# Patient Record
Sex: FEMALE | Race: WHITE | NOT HISPANIC OR LATINO | Employment: OTHER | URBAN - METROPOLITAN AREA
[De-identification: names, ages, dates, MRNs, and addresses within clinical notes are randomized per-mention and may not be internally consistent; named-entity substitution may affect disease eponyms.]

---

## 2017-01-27 ENCOUNTER — APPOINTMENT (EMERGENCY)
Dept: RADIOLOGY | Facility: HOSPITAL | Age: 82
End: 2017-01-27
Payer: COMMERCIAL

## 2017-01-27 ENCOUNTER — HOSPITAL ENCOUNTER (EMERGENCY)
Facility: HOSPITAL | Age: 82
Discharge: HOME | End: 2017-01-27
Admitting: EMERGENCY MEDICINE
Payer: COMMERCIAL

## 2017-01-27 VITALS
RESPIRATION RATE: 20 BRPM | BODY MASS INDEX: 21.63 KG/M2 | TEMPERATURE: 97.8 F | OXYGEN SATURATION: 96 % | HEART RATE: 70 BPM | WEIGHT: 130 LBS | DIASTOLIC BLOOD PRESSURE: 53 MMHG | SYSTOLIC BLOOD PRESSURE: 109 MMHG

## 2017-01-27 DIAGNOSIS — S70.01XA CONTUSION OF RIGHT HIP, INITIAL ENCOUNTER: Primary | ICD-10-CM

## 2017-01-27 PROCEDURE — 73502 X-RAY EXAM HIP UNI 2-3 VIEWS: CPT

## 2017-01-27 PROCEDURE — 99284 EMERGENCY DEPT VISIT MOD MDM: CPT

## 2017-01-27 RX ORDER — ACETAMINOPHEN 325 MG/1
650 TABLET ORAL ONCE
Status: COMPLETED | OUTPATIENT
Start: 2017-01-27 | End: 2017-01-27

## 2017-01-27 RX ADMIN — ACETAMINOPHEN 650 MG: 325 TABLET, FILM COATED ORAL at 20:29

## 2018-01-10 NOTE — PROCEDURES
Procedures by Kriss Glover PA-C at  7/23/2016  5:58 PM      Author:  Kriss Glover PA-C Service:  Critical Care/ICU Author Type:  Physician Assistant    Filed:  7/23/2016  6:01 PM Date of Service:  7/23/2016  5:58 PM Status:  Attested    :  Kriss Glover PA-C (Physician Assistant)  Cosigner:  Horacio Devi MD at 7/23/2016  6:16 PM      Procedure Orders:       1  CENTRAL LINE [[id number]] ordered by Kriss Glover PA-C at 07/23/16 1758                 Post-procedure Diagnoses:       1  Septic shock due to urinary tract infection [N39 0, R65 21]              Attestation signed by Horacio Devi MD at 7/23/2016  6:16 PM           I was present for and participated in all aspects of this procedure  No Complications  All ports flushed and with good blood return  CXR Good placement, no PTX                                           Central Line Insertion  Date/Time: 7/23/2016 5:58 PM  Performed by: Abbe Galarza  Authorized by: Abbe Galarza     Patient location:  Bedside  Consent:     Consent obtained:  Written    Consent given by:  Rio Ordonez (Daughter)  Universal protocol:     Procedure explained and questions answered to patient or proxy's satisfaction: yes      Site/side marked: yes      Immediately prior to procedure, a time out was called: yes      Patient identity confirmed:  Verbally with patient and hospital-assigned identification number  Pre-procedure details:     Hand hygiene: Hand hygiene performed prior to insertion      Sterile barrier technique: All elements of maximal sterile technique followed      Skin preparation:  2% chlorhexidine  Indications:     Central line indications: vascular access    Anesthesia (see MAR for exact dosages):      Anesthesia method:  Local infiltration    Local anesthetic:  Lidocaine 1% w/o epi (2 mL)  Procedure details:     Location:  Right internal jugular    Vessel type: vein      Laterality:  Right    Approach: percutaneous technique used      Catheter type: Triple lumen 16cm    Ultrasound guidance: yes      Sterile ultrasound techniques: Sterile gel and sterile probe covers were used      Number of attempts:  2    Successful placement: yes    Post-procedure details:     Post-procedure:  Dressing applied and line sutured    Assessment:  Blood return through all ports    Patient tolerance of procedure:   Tolerated well, no immediate complications (CXR pending)                     Received for:Provider  EPIC   Jul 23 2016  6:15PM [facility] Standard Time

## 2018-01-11 NOTE — PROCEDURES
Procedures by Madelaine Simmonds, RN at 10/24/2016  2:45 PM      Author:  Madelaine Simmonds, RN Service:  (none) Author Type:  Registered Nurse     Filed:  10/24/2016  2:48 PM Date of Service:  10/24/2016  2:45 PM Status:  Signed     :  Madelaine Simmonds, RN (Registered Nurse)         Procedure Orders:       1  Insert PICC line [id number] ordered by Angelo Yang MD at 10/24/16 0622                    Insert PICC line  Date/Time: 10/24/2016 2:45 PM  Performed by: Andrew Ramos by: Thomas Murphy     Patient location:  Bedside  Procedure performed by consultant:  Karissa VELIZ  Consent:     Consent obtained:  Written (Manisha Greenberg obtained consent)    Consent given by: pts POA verbally  Universal protocol:     Procedure explained and questions answered to patient or proxy's satisfaction: yes      Relevant documents present and verified: yes      Test results available and properly labeled: yes       Imaging studies available: yes      Required blood products, implants, devices, and special equipment available: yes      Site/side marked: yes      Immediately prior to procedure, a time out was called: yes      Patient identity confirmed:  Verbally with patient and arm band  Pre-procedure details:     Hand hygiene: Hand hygiene performed prior to insertion      Sterile barrier technique: All elements of maximal sterile technique followed      Skin preparation:  ChloraPrep    Skin preparation agent: Skin preparation agent completely dried prior to procedure    Indications:     PICC line indications: long term antibiotics    Anesthesia (see MAR for exact dosages):      Anesthesia method:  Local infiltration    Local anesthetic:  Lidocaine 1% w/o epi (2 ml)  Procedure details:     Approach: percutaneous technique used      Procedural supplies:  Double lumen    Catheter size:  5 Fr    Landmarks identified: yes      Ultrasound guidance: yes      Sterile ultrasound techniques: Sterile gel and sterile probe covers were used      Number of attempts:  1    Successful placement: yes      Vessel of catheter tip end:  Sherlock 3 cg confirmed     Total catheter length (cm):  43    Catheter out on skin (cm):  4    Max flow rate:  999 ml/hr    Arm circumference:  21  Post-procedure details:     Post-procedure:  Securement device placed and dressing applied    Post-procedure complications: none      Patient tolerance of procedure:   Tolerated well, no immediate complications                     Received for:Provider  EPIC   Oct 24 2016  2:48PM [facility] Standard Time

## 2018-01-12 NOTE — PROCEDURES
Procedures by Anders Colón RN at  8/5/2016  2:45 PM      Author:  Anders Colón RN Service:  Interventional Radiology  Author Type:  Registered Nurse     Filed:  8/5/2016  2:51 PM Date of Service:  8/5/2016  2:45 PM Status:  Signed     :  Anders Colón RN (Registered Nurse)         Procedure Orders:       1  Insert PICC line [[id number]] ordered by Anders Colón RN at 08/05/16 1445                    PICC Line Insertion  Date/Time: 8/5/2016 2:45 PM  Performed by: Jourdan Saavedra  Authorized by: Lan Mccoy     Patient location:  Other (comment) (IR)  Consent:     Consent obtained:  Written    Consent given by:  Patient  Universal protocol:     Procedure explained and questions answered to patient or proxy's satisfaction: yes      Relevant documents present and verified: yes      Test results available and properly labeled: yes       Imaging studies available: yes      Required blood products, implants, devices, and special equipment available: yes      Site/side marked: yes      Immediately prior to procedure, a time out was called: yes      Patient identity confirmed:  Verbally with patient and arm band  Pre-procedure details:     Hand hygiene: Hand hygiene performed prior to insertion      Sterile barrier technique: All elements of maximal sterile technique followed      Skin preparation:  ChloraPrep  Indications:     PICC line indications: vascular access    Anesthesia (see MAR for exact dosages):      Anesthesia method:  None  Procedure details:     Location:  Brachial    Vessel type: vein      Laterality:  Left    Catheter size:  5 Fr    Landmarks identified: yes      Number of attempts:  1    Successful placement: yes      Vessel of catheter tip end:  Svc    Total catheter length (cm):  47    Catheter out on skin (cm):  0    Max flow rate:  999  Post-procedure details:     Post-procedure:  Dressing applied                     Received for:Provider  [facility]   Aug  5 2016  2:49PM [facility] Standard Time

## 2018-01-12 NOTE — MISCELLANEOUS
Message  Received call from radiology department to inform of stat lumbar spine xrays on this patient  Read on lumbar spine xray as follows deformity of L2-3 similar to prior CT scan completed on October 20,2016  Degenerative disc disease and/or discitis at L1-2  Spinal fusion at L4-5  Patient has follow up appointment on 11/17/16 at 9:00am with [facility]  Will send task to [facility] to make sure she is aware of xray finding for follow up appointment        Signatures   Electronically signed by : Salina Saldivar; Nov 8 2016  3:41PM EST                       (Author)

## 2018-01-12 NOTE — PROCEDURES
Procedures by Asher Tovar RN at 9/2/2016   3:37 PM      Author:  Asher Tovar RN Service:  Outpatient Author Type:  Registered Nurse     Filed:  9/2/2016  3:51 PM Date of Service:  9/2/2016  3:37 PM Status:  Signed     :  Asher Tovar RN (Registered Nurse)         Procedure Orders:       1  Insert PICC line [id number] ordered by Asher Tovar RN at 09/02/16 1538                 Post-procedure Diagnoses:       1  Sepsis due to Escherichia coli [A41 51]                   PICC Line Insertion  Date/Time: 9/2/2016 3:00 PM  Performed by: Jose Cruz Bradley  Authorized by: Michelle Araiza     Patient location:  Bedside  Other Assisting Provider:  Yes (comment) (Karime Coronel RN)    Consent:     Consent obtained:  Written (By Physician)    Consent given by:  Patient    Procedural risks discussed: with Physician  Universal protocol:     Procedure explained and questions answered to patient or proxy's satisfaction: yes      Relevant documents present and verified: yes      Test results available and properly labeled: yes       Imaging studies available: yes      Required blood products, implants, devices, and special equipment available: yes      Site/side marked: yes      Immediately prior to procedure, a time out was called: yes  (with Karime Coronel RN)      Patient identity confirmed:  Verbally with patient and arm band  Pre-procedure details:     Hand hygiene: Hand hygiene performed prior to insertion      Sterile barrier technique: All elements of maximal sterile technique followed      Skin preparation:  ChloraPrep    Skin preparation agent: Skin preparation agent completely dried prior to procedure    Indications:     PICC line indications: vascular access, long term antibiotics and new site    Anesthesia (see MAR for exact dosages):      Anesthesia method:  Local infiltration (1 ml)    Local anesthetic:  Lidocaine 1% w/o epi  Procedure details:     Location:  Brachial    Vessel type: vein      Laterality: Left    Site selection rationale: Other vessels on did not compress well  Approach: percutaneous technique used      Patient position:  Flat    Procedural supplies:  Double lumen    Catheter size:  5 Fr    Landmarks identified: yes      Ultrasound guidance: yes (W[employer]can 3CG technology)      Sterile ultrasound techniques: Sterile gel and sterile probe covers were used      Number of attempts:  2    Successful placement: yes      Vessel of catheter tip end:  CAJ per [employer], strip in chart  Total catheter length (cm):  45 cm    Catheter out on skin (cm):  5 cm    Max flow rate:  999 ml/hr    Arm circumference:  27 cm  Post-procedure details:     Post-procedure:  Dressing applied and securement device placed    Assessment:  Blood return through all ports and free fluid flow    Post-procedure complications: hemorrhage and local hematoma      Post-procedure complications comment:  Small amount of bleeding at site, pressure applied    Patient tolerance of procedure: Tolerated well, no immediate complications    Observer: Yes      Observer name:  Star Hadley RN  Comments:      PICC insertion tolerated well  Manual pressure held x5-6 minutes, then small pressure dressing applied for hemostasis at site for sm amt of bleeding  ECG strip on chart confirming placement in CAJ  OK TO USE                         Received for:Provider  EPIC   Sep  2 2016  3:51PM [facility] Standard Time

## 2018-01-15 NOTE — PROCEDURES
Procedures by Margareth Correa RN at 8/15/2016   3:33 PM      Author:  Margareth Correa RN Service:  (none) Author Type:  Registered Nurse     Filed:  8/15/2016  3:36 PM Date of Service:  8/15/2016  3:33 PM Status:  Signed     :  Margareth Correa RN (Registered Nurse)         Procedure Orders:       1  Insert PICC line [[id number]] ordered by LANIE Middleton at 08/15/16 1422                    Insert PICC line  Date/Time: 8/15/2016 3:33 PM  Performed by: Liz Rubin by: Keny Wilde     Patient location:  Bedside  Consent:     Consent obtained:  Written and verbal (physician obtained consent from [facility] )  Universal protocol:     Procedure explained and questions answered to patient or proxy's satisfaction: yes      Relevant documents present and verified: yes      Test results available and properly labeled: yes       Imaging studies available: yes      Required blood products, implants, devices, and special equipment available: yes      Site/side marked: yes      Immediately prior to procedure, a time out was called: yes      Patient identity confirmed:  Verbally with patient and arm band  Pre-procedure details:     Hand hygiene: Hand hygiene performed prior to insertion      Sterile barrier technique: All elements of maximal sterile technique followed      Skin preparation:  ChloraPrep    Skin preparation agent: Skin preparation agent completely dried prior to procedure    Indications:     PICC line indications: long term antibiotics    Anesthesia (see MAR for exact dosages):      Anesthesia method:  Local infiltration    Local anesthetic:  Lidocaine 1% w/o epi (2 ml)  Procedure details:     Location:  Basilic    Vessel type: vein      Laterality:  Right    Approach: percutaneous technique used      Patient position:  Flat    Procedural supplies:  Double lumen    Catheter size:  5 Fr    Landmarks identified: yes      Ultrasound guidance: yes      Sterile ultrasound techniques: Sterile gel and sterile probe covers were used      Number of attempts:  1    Successful placement: yes      Vessel of catheter tip end:  Sherlock 3 CG comfirmed  OK to use, reported to RN    Total catheter length (cm):  43    Catheter out on skin (cm):  0    Max flow rate:  999 ml / hr    Arm circumference:  24  Post-procedure details:     Post-procedure:  Dressing applied and securement device placed    Assessment:  Blood return through all ports and free fluid flow    Post-procedure complications: none      Patient tolerance of procedure: Tolerated well, no immediate complications    Observer: Yes      Observer name: KEREN Guerrero  Comments:      Suggested wrapping the piccline with edis                       Received for:Provider  EPIC   Aug 15 2016  3:35PM [facility] Standard Time

## 2018-01-16 NOTE — PROCEDURES
Procedures by Nitesh Packer RN at 7/27/2016  3:28 PM      Author:  Nitesh Packer RN Service:  (none) Author Type:  Registered Nurse     Filed:  7/27/2016  3:30 PM Date of Service:  7/27/2016  3:28 PM Status:  Signed     :  Nitesh Packer RN (Registered Nurse)         Procedure Orders:       1  Insert PICC line [[id number]] ordered by [place] Nemesio at 07/27/16 0654                    Insert PICC line  Date/Time: 7/27/2016 3:28 PM  Performed by: Giana Carr by: Elisa Almaraz     Patient location:  Bedside  Other Assisting Provider:  Yes (comment) (Braulio Nose PCA)    Consent:     Consent obtained:  Written (Stephania Field obtained consent)    Consent given by:  Patient  Universal protocol:     Procedure explained and questions answered to patient or proxy's satisfaction: yes      Relevant documents present and verified: yes      Test results available and properly labeled: yes       Imaging studies available: yes      Required blood products, implants, devices, and special equipment available: yes      Site/side marked: yes      Immediately prior to procedure, a time out was called: yes      Patient identity confirmed:  Verbally with patient and arm band  Pre-procedure details:     Hand hygiene: Hand hygiene performed prior to insertion      Sterile barrier technique: All elements of maximal sterile technique followed      Skin preparation:  ChloraPrep    Skin preparation agent: Skin preparation agent completely dried prior to procedure    Indications:     PICC line indications: vascular access    Anesthesia (see MAR for exact dosages):      Anesthesia method:  Local infiltration    Local anesthetic:  Lidocaine 1% w/o epi (2 ml)  Procedure details:     Location:  Brachial    Vessel type: vein      Laterality:  Right    Approach: percutaneous technique used      Patient position:  Flat    Catheter size:  5 Fr    Landmarks identified: yes      Ultrasound guidance: yes Sterile ultrasound techniques: Sterile gel and sterile probe covers were used      Number of attempts:  1    Successful placement: yes      Vessel of catheter tip end:  Sherlock 3 cg confirmed    Total catheter length (cm):  42    Catheter out on skin (cm):  0    Max flow rate:  999ml/hr    Arm circumference:  23  Post-procedure details:     Post-procedure:  Securement device placed and dressing applied    Assessment:  Blood return through all ports    Post-procedure complications: none      Patient tolerance of procedure:   Tolerated well, no immediate complications                     Received for:Provider  [facility]   Jul 27 2016  3:29PM [facility] Standard Time

## 2018-01-16 NOTE — PROCEDURES
Procedures by Reginald Garcia RN at 8/5/2016 12:03 PM      Author:  Reginald Garcia RN Service:  (none) Author Type:  Registered Nurse     Filed:  8/5/2016 12:05 PM Date of Service:  8/5/2016 12:03 PM Status:  Signed     :  Reginald Garcia RN (Registered Nurse)         Procedure Orders:       1  Insert PICC line [[id number]] ordered by Kalie Leos MD at 08/05/16 1009                    Insert PICC line  Date/Time: 8/5/2016 12:03 PM  Performed by: Mariya Fajardo by: Ezra Solis     Patient location:  Bedside  Other Assisting Provider:  Yes (comment) (Jackie Cameron PAC)    Consent:     Consent obtained:  Written (Nemo Parkinson obtained consent )    Consent given by:  Patient  Universal protocol:     Procedure explained and questions answered to patient or proxy's satisfaction: yes      Relevant documents present and verified: yes      Test results available and properly labeled: yes       Imaging studies available: yes      Required blood products, implants, devices, and special equipment available: yes      Site/side marked: yes      Immediately prior to procedure, a time out was called: yes      Patient identity confirmed:  Verbally with patient and arm band  Pre-procedure details:     Hand hygiene: Hand hygiene performed prior to insertion      Sterile barrier technique: All elements of maximal sterile technique followed      Skin preparation:  ChloraPrep    Skin preparation agent: Skin preparation agent completely dried prior to procedure    Indications:     PICC line indications: long term antibiotics    Anesthesia (see MAR for exact dosages):      Anesthesia method:  Local infiltration (5 ml)    Local anesthetic:  Lidocaine 1% w/o epi  Procedure details:     Location:  Median cubital    Vessel type: vein      Laterality:  Left    Approach: open technique used      Patient position:  Flat    Catheter size:  5 Fr    Landmarks identified: yes      Ultrasound guidance: yes Sterile ultrasound techniques: Sterile gel and sterile probe covers were used      Number of attempts:  4    Successful placement: yes    Post-procedure details:     Post-procedure:  Dressing applied and securement device placed    Assessment:  Blood return through all ports    Post-procedure complications: none      Patient tolerance of procedure:   Tolerated well, no immediate complications                     Received for:Provider  EPIC   Aug  5 2016 12:03PM [facility] Standard Time

## 2018-01-16 NOTE — RESULT NOTES
Verified Results  (1) BASIC METABOLIC PROFILE [id number] 12:00AM Bryant Bumpers     Test Name Result Flag Reference   Glucose, Serum 247 mg/dL H 65-99   BUN 21 mg/dL  8-27   Creatinine, Serum 0 77 mg/dL  0 57-1 00   eGFR If NonAfricn Am 70 mL/min/1 73  >59   eGFR If Africn Am 80 mL/min/1 73  >59   BUN/Creatinine Ratio 27 H 11-26   Sodium, Serum 139 mmol/L  134-144   Potassium, Serum 4 3 mmol/L  3 5-5 2   Chloride, Serum 102 mmol/L     Carbon Dioxide, Total 22 mmol/L  18-29   Calcium, Serum 8 9 mg/dL  8 7-10 3